# Patient Record
Sex: FEMALE | Race: OTHER | Employment: FULL TIME | URBAN - METROPOLITAN AREA
[De-identification: names, ages, dates, MRNs, and addresses within clinical notes are randomized per-mention and may not be internally consistent; named-entity substitution may affect disease eponyms.]

---

## 2018-08-21 ENCOUNTER — HOSPITAL ENCOUNTER (EMERGENCY)
Age: 48
Discharge: HOME | End: 2018-08-21
Attending: EMERGENCY MEDICINE
Payer: COMMERCIAL

## 2018-08-21 VITALS
TEMPERATURE: 97 F | OXYGEN SATURATION: 98 % | DIASTOLIC BLOOD PRESSURE: 54 MMHG | WEIGHT: 200 LBS | HEIGHT: 61 IN | HEART RATE: 76 BPM | BODY MASS INDEX: 37.76 KG/M2 | SYSTOLIC BLOOD PRESSURE: 133 MMHG | RESPIRATION RATE: 16 BRPM

## 2018-08-21 DIAGNOSIS — H01.001 BLEPHARITIS OF RIGHT UPPER EYELID, UNSPECIFIED TYPE: Primary | ICD-10-CM

## 2018-08-21 PROCEDURE — 99283 EMERGENCY DEPT VISIT LOW MDM: CPT

## 2018-08-21 RX ORDER — CEPHALEXIN 500 MG/1
500 CAPSULE ORAL 4 TIMES DAILY
Qty: 28 CAPSULE | Refills: 0 | Status: SHIPPED | OUTPATIENT
Start: 2018-08-21 | End: 2018-08-28

## 2018-08-21 RX ORDER — TETRACAINE HYDROCHLORIDE 5 MG/ML
1 SOLUTION OPHTHALMIC ONCE
Status: DISCONTINUED | OUTPATIENT
Start: 2018-08-21 | End: 2018-08-21

## 2018-08-21 RX ORDER — POLYMYXIN B SULFATE AND TRIMETHOPRIM 1; 10000 MG/ML; [USP'U]/ML
2 SOLUTION OPHTHALMIC EVERY 4 HOURS
Qty: 1 BOTTLE | Refills: 0 | Status: SHIPPED | OUTPATIENT
Start: 2018-08-21 | End: 2020-07-01 | Stop reason: ALTCHOICE

## 2018-08-21 NOTE — ED PROVIDER NOTES
Patient Seen in: THE [facility] Emergency Department In [place]    History   Patient presents with:  Cellulitis (integumentary, infectious)  Eye Visual Problem (opthalmic)    Stated Complaint: right eye irratation redness and swelling    HPI    Patient report appropriately. Eyes: sclera white, conjunctiva pink and moist, midrange pupils, equal round reactive to light, extraocular movements are intact. The right upper lid is erythematous, warm, and tender to touch. No fluctuance.   At the medial third, there i List    START taking these medications    cephALEXin (KEFLEX) 500 MG Oral Cap  Take 1 capsule (500 mg total) by mouth 4 (four) times daily.   Qty: 28 capsule Refills: 0    Polymyxin B-Trimethoprim (POLYTRIM) 99035-2.1 UNIT/ML-% Ophthalmic Solution  Place 2

## 2019-02-06 ENCOUNTER — HOSPITAL ENCOUNTER (EMERGENCY)
Age: 49
Discharge: HOME | End: 2019-02-06
Attending: EMERGENCY MEDICINE
Payer: COMMERCIAL

## 2019-02-06 ENCOUNTER — APPOINTMENT (OUTPATIENT)
Dept: GENERAL RADIOLOGY | Age: 49
End: 2019-02-06
Attending: EMERGENCY MEDICINE
Payer: COMMERCIAL

## 2019-02-06 VITALS
SYSTOLIC BLOOD PRESSURE: 135 MMHG | HEIGHT: 62 IN | OXYGEN SATURATION: 96 % | DIASTOLIC BLOOD PRESSURE: 54 MMHG | BODY MASS INDEX: 42.33 KG/M2 | WEIGHT: 230 LBS | HEART RATE: 90 BPM | TEMPERATURE: 100 F | RESPIRATION RATE: 18 BRPM

## 2019-02-06 DIAGNOSIS — J01.90 ACUTE SINUSITIS, RECURRENCE NOT SPECIFIED, UNSPECIFIED LOCATION: Primary | ICD-10-CM

## 2019-02-06 DIAGNOSIS — J40 BRONCHITIS: ICD-10-CM

## 2019-02-06 PROCEDURE — 99284 EMERGENCY DEPT VISIT MOD MDM: CPT | Performed by: EMERGENCY MEDICINE

## 2019-02-06 PROCEDURE — 93005 ELECTROCARDIOGRAM TRACING: CPT

## 2019-02-06 PROCEDURE — 71046 X-RAY EXAM CHEST 2 VIEWS: CPT | Performed by: EMERGENCY MEDICINE

## 2019-02-06 PROCEDURE — 93010 ELECTROCARDIOGRAM REPORT: CPT | Performed by: EMERGENCY MEDICINE

## 2019-02-06 RX ORDER — BENZONATATE 100 MG/1
100 CAPSULE ORAL 3 TIMES DAILY PRN
Qty: 30 CAPSULE | Refills: 0 | Status: SHIPPED | OUTPATIENT
Start: 2019-02-06 | End: 2019-03-08

## 2019-02-06 RX ORDER — AMOXICILLIN 500 MG/1
500 TABLET, FILM COATED ORAL 3 TIMES DAILY
Qty: 30 TABLET | Refills: 0 | Status: SHIPPED | OUTPATIENT
Start: 2019-02-06 | End: 2019-02-16

## 2019-02-07 NOTE — ED PROVIDER NOTES
Patient Seen in: THE [facility] Emergency Department In [place]    History   Patient presents with:  Fever (infectious)    Stated Complaint: FLU LIKE SYMPTOMS    HPI    This is a 15-year-old female who presents with complaints of flulike symptoms.   She has ha findings of a viral syndrome. TMs are clear without erythema  Throat has no erythema, exudate, peritonsillar abscess  There is no meningismus. She does have some maxillary sinus tenderness. Lungs: Clear to auscultation. No wheezing.   No retractions clinically sounds more of a bronchitis EKG slightly abnormal but she the pain is really only when she coughs I discussed we can do blood work and further testing she had and she does not want to do that she feels comfortable with conservative management sh

## 2019-02-07 NOTE — ED INITIAL ASSESSMENT (HPI)
Pt states that she has had body aches, ear pain, chest pressure, productive cough with green phlegm for the past three days. Pt states that she started with a fever today. Pt took Tylenol around 12.  Pt is afebrile here

## 2019-02-08 LAB
ATRIAL RATE: 71 BPM
P AXIS: 36 DEGREES
P-R INTERVAL: 152 MS
Q-T INTERVAL: 398 MS
QRS DURATION: 104 MS
QTC CALCULATION (BEZET): 432 MS
R AXIS: -10 DEGREES
T AXIS: 10 DEGREES
VENTRICULAR RATE: 71 BPM

## 2020-07-01 ENCOUNTER — OFFICE VISIT (OUTPATIENT)
Dept: FAMILY MEDICINE CLINIC | Facility: CLINIC | Age: 50
End: 2020-07-01
Payer: COMMERCIAL

## 2020-07-01 VITALS
HEIGHT: 63.5 IN | RESPIRATION RATE: 17 BRPM | BODY MASS INDEX: 37.97 KG/M2 | HEART RATE: 111 BPM | OXYGEN SATURATION: 97 % | SYSTOLIC BLOOD PRESSURE: 132 MMHG | WEIGHT: 217 LBS | TEMPERATURE: 98 F | DIASTOLIC BLOOD PRESSURE: 82 MMHG

## 2020-07-01 DIAGNOSIS — Z13.21 SCREENING FOR ENDOCRINE, NUTRITIONAL, METABOLIC AND IMMUNITY DISORDER: ICD-10-CM

## 2020-07-01 DIAGNOSIS — Z13.228 SCREENING FOR ENDOCRINE, NUTRITIONAL, METABOLIC AND IMMUNITY DISORDER: ICD-10-CM

## 2020-07-01 DIAGNOSIS — Z12.31 ENCOUNTER FOR SCREENING MAMMOGRAM FOR MALIGNANT NEOPLASM OF BREAST: ICD-10-CM

## 2020-07-01 DIAGNOSIS — Z13.29 SCREENING FOR ENDOCRINE, NUTRITIONAL, METABOLIC AND IMMUNITY DISORDER: ICD-10-CM

## 2020-07-01 DIAGNOSIS — Z13.0 SCREENING FOR ENDOCRINE, NUTRITIONAL, METABOLIC AND IMMUNITY DISORDER: ICD-10-CM

## 2020-07-01 DIAGNOSIS — R10.31 ABDOMINAL PAIN, RLQ: ICD-10-CM

## 2020-07-01 DIAGNOSIS — G89.29 CHRONIC ABDOMINAL PAIN: Primary | ICD-10-CM

## 2020-07-01 DIAGNOSIS — R10.9 CHRONIC ABDOMINAL PAIN: Primary | ICD-10-CM

## 2020-07-01 LAB
APPEARANCE: CLEAR
BILIRUBIN: NEGATIVE
GLUCOSE (URINE DIPSTICK): NEGATIVE MG/DL
KETONES (URINE DIPSTICK): NEGATIVE MG/DL
MULTISTIX LOT#: ABNORMAL NUMERIC
NITRITE, URINE: NEGATIVE
OCCULT BLOOD: NEGATIVE
PH, URINE: 6.5 (ref 4.5–8)
PROTEIN (URINE DIPSTICK): NEGATIVE MG/DL
SPECIFIC GRAVITY: 1.02 (ref 1–1.03)
URINE-COLOR: YELLOW
UROBILINOGEN,SEMI-QN: 0.2 MG/DL (ref 0–1.9)

## 2020-07-01 PROCEDURE — 81003 URINALYSIS AUTO W/O SCOPE: CPT | Performed by: EMERGENCY MEDICINE

## 2020-07-01 PROCEDURE — 99204 OFFICE O/P NEW MOD 45 MIN: CPT | Performed by: EMERGENCY MEDICINE

## 2020-07-01 NOTE — PROGRESS NOTES
q  Chief Complaint:   Patient presents with:  Abdominal Pain: NP. C/o RT abdominal/low back pain     HPI:   This is a 44year old female         ABDOMINAL PAIN  On going for the past several months. Starts in epigastric area and then radiates to RLQ.  PAin (Temporal)   Resp 17   Ht 63.5\"   Wt 217 lb (98.4 kg)   SpO2 97%   BMI 37.84 kg/m²  Estimated body mass index is 37.84 kg/m² as calculated from the following:    Height as of this encounter: 63.5\". Weight as of this encounter: 217 lb (98.4 kg).    Gail colonoscopy      PATIENT INSTRUCTIONS:    1. Arrange for CT scan  2. Have blood tests done after fasting  3. Follow up with GI, Dr. Judith Shea. Marco Antoniou  4. Follow up in 2-3 weeks for annual physical and PAP  5.  Arrange for mammogram          FOLLOW UP: 2-3 weeks

## 2020-07-01 NOTE — PATIENT INSTRUCTIONS
Thank you for choosing [facility]  To Do:  FOR [address]        1. Arrange for CT scan  2. Have blood tests done after fasting  3. Follow up with GI, Dr. Kim Muñiz  4. Follow up in 2-3 weeks for annual physical and PAP  5.  Arrange for [employer] yourself. Diet  · Don’t force yourself to eat, especially if having cramps, vomiting, or diarrhea. · Water is important so you don't get dehydrated. Soup may also be good. Sports drinks may also help, especially if they are not too acidic.  Don't drink mott [address], [facility] [place]. All rights reserved. This information is not intended as a substitute for professional medical care. Always follow your healthcare professional's instructions.         Abdominal Pain  Abdominal pain is pain in the stomach or be report any family history of stomach or intestinal problems, or cancers. Tell your provider about all your alcohol use and drug use. Tell your provider about all medicines you use, including herbs, vitamins, and supplements.    Treating abdominal pain   Jose aspirin or over-the-counter pain and fever medicines, if possible. This includes nonsteroidal anti-inflammatory drugs (NSAIDs). · Lose excess weight. · Finish eating at least 2 hours before you go to bed or lie down. · Raise the head of your bed.   Malorie Polanco [address]

## 2020-07-02 ENCOUNTER — HOSPITAL ENCOUNTER (OUTPATIENT)
Dept: CT IMAGING | Age: 50
Discharge: HOME | End: 2020-07-02
Attending: EMERGENCY MEDICINE
Payer: COMMERCIAL

## 2020-07-02 ENCOUNTER — TELEPHONE (OUTPATIENT)
Dept: FAMILY MEDICINE CLINIC | Facility: CLINIC | Age: 50
End: 2020-07-02

## 2020-07-02 DIAGNOSIS — G89.29 CHRONIC ABDOMINAL PAIN: ICD-10-CM

## 2020-07-02 DIAGNOSIS — R10.31 ABDOMINAL PAIN, RLQ: ICD-10-CM

## 2020-07-02 DIAGNOSIS — G89.29 CHRONIC ABDOMINAL PAIN: Primary | ICD-10-CM

## 2020-07-02 DIAGNOSIS — R10.9 CHRONIC ABDOMINAL PAIN: ICD-10-CM

## 2020-07-02 DIAGNOSIS — R10.9 CHRONIC ABDOMINAL PAIN: Primary | ICD-10-CM

## 2020-07-02 LAB — CREAT BLD-MCNC: 0.8 MG/DL (ref 0.55–1.02)

## 2020-07-02 PROCEDURE — 82565 ASSAY OF CREATININE: CPT

## 2020-07-02 PROCEDURE — 74177 CT ABD & PELVIS W/CONTRAST: CPT | Performed by: EMERGENCY MEDICINE

## 2020-07-02 NOTE — TELEPHONE ENCOUNTER
Shilpi Walker from [place] [id number] called. Patient is scheduled today for CT Abdomen with and without contrast.  He states that the recommendation is CT abdomen and pelvis contrast only. Patient is scheduled for today.     Spoke with awa Nieves for updated or

## 2020-07-06 ENCOUNTER — TELEPHONE (OUTPATIENT)
Dept: FAMILY MEDICINE CLINIC | Facility: CLINIC | Age: 50
End: 2020-07-06

## 2020-07-06 NOTE — TELEPHONE ENCOUNTER
Katie Perea MD  [facility] Clinical Staff             No acute abdominal findings   + hepatomegaly   Follow up with GI as instructed on last OV

## 2020-07-06 NOTE — TELEPHONE ENCOUNTER
Patient calling back again, wants to know if she will be contacted today with results, please f/u with pt

## 2020-08-31 PROBLEM — R10.31 CHRONIC RLQ PAIN: Status: ACTIVE | Noted: 2020-08-31

## 2020-08-31 PROBLEM — G89.29 CHRONIC RLQ PAIN: Status: ACTIVE | Noted: 2020-08-31

## 2022-04-22 ENCOUNTER — TELEPHONE (OUTPATIENT)
Dept: FAMILY MEDICINE CLINIC | Facility: CLINIC | Age: 52
End: 2022-04-22

## 2024-04-11 ENCOUNTER — TELEPHONE (OUTPATIENT)
Dept: FAMILY MEDICINE CLINIC | Facility: CLINIC | Age: 54
End: 2024-04-11

## 2024-04-11 NOTE — TELEPHONE ENCOUNTER
Patient scheduled an appt with you on [facility] but she hasn't been seen here since 07/2021,  Ok to re-establish or no?  She has [employer] PPO.  Please advise.

## 2024-07-15 ENCOUNTER — TELEPHONE (OUTPATIENT)
Dept: FAMILY MEDICINE CLINIC | Facility: CLINIC | Age: 54
End: 2024-07-15

## 2024-07-15 NOTE — TELEPHONE ENCOUNTER
Patient was last seen by you on 07/01/2020, Pt would like to schedule an appointment with you. Will you see her again?

## 2024-07-16 ENCOUNTER — HOSPITAL ENCOUNTER (OUTPATIENT)
Dept: GENERAL RADIOLOGY | Age: 54
Discharge: HOME | End: 2024-07-16
Attending: NURSE PRACTITIONER
Payer: COMMERCIAL

## 2024-07-16 ENCOUNTER — OFFICE VISIT (OUTPATIENT)
Dept: FAMILY MEDICINE CLINIC | Facility: CLINIC | Age: 54
End: 2024-07-16
Payer: COMMERCIAL

## 2024-07-16 VITALS
SYSTOLIC BLOOD PRESSURE: 110 MMHG | WEIGHT: 204 LBS | DIASTOLIC BLOOD PRESSURE: 80 MMHG | HEIGHT: 62 IN | RESPIRATION RATE: 16 BRPM | OXYGEN SATURATION: 98 % | BODY MASS INDEX: 37.54 KG/M2 | HEART RATE: 72 BPM

## 2024-07-16 DIAGNOSIS — M77.11 LATERAL EPICONDYLITIS OF RIGHT ELBOW: ICD-10-CM

## 2024-07-16 DIAGNOSIS — S16.1XXA STRAIN OF NECK MUSCLE, INITIAL ENCOUNTER: ICD-10-CM

## 2024-07-16 DIAGNOSIS — M24.811 INTERNAL DERANGEMENT OF RIGHT SHOULDER: Primary | ICD-10-CM

## 2024-07-16 DIAGNOSIS — M24.811 INTERNAL DERANGEMENT OF RIGHT SHOULDER: ICD-10-CM

## 2024-07-16 PROCEDURE — 3074F SYST BP LT 130 MM HG: CPT | Performed by: NURSE PRACTITIONER

## 2024-07-16 PROCEDURE — 73030 X-RAY EXAM OF SHOULDER: CPT | Performed by: NURSE PRACTITIONER

## 2024-07-16 PROCEDURE — 99204 OFFICE O/P NEW MOD 45 MIN: CPT | Performed by: NURSE PRACTITIONER

## 2024-07-16 PROCEDURE — 3008F BODY MASS INDEX DOCD: CPT | Performed by: NURSE PRACTITIONER

## 2024-07-16 PROCEDURE — 3079F DIAST BP 80-89 MM HG: CPT | Performed by: NURSE PRACTITIONER

## 2024-07-16 PROCEDURE — 72050 X-RAY EXAM NECK SPINE 4/5VWS: CPT | Performed by: NURSE PRACTITIONER

## 2024-07-16 RX ORDER — METHYLPREDNISOLONE 4 MG/1
TABLET ORAL
Qty: 21 EACH | Refills: 0 | Status: SHIPPED | OUTPATIENT
Start: 2024-07-16

## 2024-07-16 RX ORDER — CYCLOBENZAPRINE HCL 10 MG
10 TABLET ORAL 3 TIMES DAILY
COMMUNITY
Start: 2024-07-14

## 2024-07-16 RX ORDER — GABAPENTIN 300 MG/1
300 CAPSULE ORAL NIGHTLY
Qty: 30 CAPSULE | Refills: 0 | Status: SHIPPED | OUTPATIENT
Start: 2024-07-16 | End: 2024-08-15

## 2024-07-16 RX ORDER — KETOROLAC TROMETHAMINE 10 MG/1
TABLET, FILM COATED ORAL
COMMUNITY
Start: 2024-07-14

## 2024-07-16 RX ORDER — IBUPROFEN 200 MG
200 TABLET ORAL EVERY 6 HOURS PRN
COMMUNITY

## 2024-07-16 NOTE — PROGRESS NOTES
Chief Complaint   Patient presents with    hospitals Care     NP esta care Right shoulder pain        HPI: R shoulder has been hurting for 5 days Sharp, Dull,throbbing  in character. Radiation to the upper mid R arm and R elbow   . Rates pain at  5-6 /10.  No weakness.  + numbness or tingling. Worsening. Movement makes it worse and rest makes it better.Rasing movement above the shoulder line is very uncomfortable for the pt.  Injury mechanism: none   No redness, bruising, lacerations.No weakness.  Current medications: muscle relaxant and ketorolac  H/o surgery of the shoulder: no       Current Outpatient Medications   Medication Sig Dispense Refill    Ketorolac Tromethamine 10 MG Oral Tab Take 1 tablet by mouth 3 times per day for 5 days Take medication with food and take a daily H2 blocker such as Pepcid AC.      cyclobenzaprine 10 MG Oral Tab Take 1 tablet (10 mg total) by mouth 3 (three) times daily.      ibuprofen (ADVIL) 200 MG Oral Tab Take 1 tablet (200 mg total) by mouth every 6 (six) hours as needed for Pain.      Aspirin-Acetaminophen-Caffeine (EXCEDRIN MIGRAINE OR) Take by mouth.        Past Medical History:    Migraines      Past Surgical History:   Procedure Laterality Date          Carpal tunnel release      Cholecystectomy      Hysterectomy      Removal gallbladder      Total abdom hysterectomy        Social History:   Social History     Socioeconomic History    Marital status:    Tobacco Use    Smoking status: Never    Smokeless tobacco: Never   Substance and Sexual Activity    Alcohol use: Yes    Drug use: Never               ROS:  GEN: no fever, no chills, no fatigue  CHEST: no chest pains.  SKIN: no rashes  HEM: no ecchymoses  JOINTS: no other joints pain.  NEURO: no tingling, no weakness, no abnormal sensation.      /80   Pulse 72   Resp 16   Ht 5' 2\" (1.575 m)   Wt 204 lb (92.5 kg)   SpO2 98%   BMI 37.31 kg/m²     PE:  Gen:  WD/WN NAD  HEENT:  DOUG  EOM-i.  LUNGS:CTA nuha.  HEART:S1/S2 reg., no murmurs, clicks, gallops  SKIN:no rashes on the chest or back.  Back:  Normal on inspection, no pain on palpation of the spinal and paraspinal muscles.   Neuro:  Reflexes at knees 2+ and symmetric  R shoulder: limited extension to 90 degrees, full extension, internal and external rotation normal, butler and neers negative, empty can testing negative. DTR and STR of limb wnl.  R elbow tenderness palpation lateral epicondyle , Limited ROM due to pain       A/P  Diagnoses and all orders for this visit:    Internal derangement of right shoulder  -     methylPREDNISolone (MEDROL) 4 MG Oral Tablet Therapy Pack; As directed.  -     XR SHOULDER, COMPLETE (MIN 2 VIEWS), RIGHT (CPT=73030); Future  -     Ortho Referral - In Network    Lateral epicondylitis of right elbow  -     methylPREDNISolone (MEDROL) 4 MG Oral Tablet Therapy Pack; As directed.  -     gabapentin 300 MG Oral Cap; Take 1 capsule (300 mg total) by mouth nightly.  -     Ortho Referral - In Network    Strain of neck muscle, initial encounter  -     XR CERVICAL SPINE (4VIEWS) (CPT=72050); Future     Rest   Ice or heat   F/u for worsening symptoms

## 2024-07-18 ENCOUNTER — TELEPHONE (OUTPATIENT)
Dept: ORTHOPEDICS CLINIC | Facility: CLINIC | Age: 54
End: 2024-07-18

## 2024-07-18 NOTE — TELEPHONE ENCOUNTER
Patient is scheduled for Internal derangement of right shoulder. X-rays in Epic. Please advise if imaging is needed.  Future Appointments   Date Time Provider Department Center   8/7/2024 11:20 AM [id number]

## 2024-07-22 ENCOUNTER — TELEPHONE (OUTPATIENT)
Dept: FAMILY MEDICINE CLINIC | Facility: CLINIC | Age: 54
End: 2024-07-22

## 2024-07-22 DIAGNOSIS — S16.1XXA STRAIN OF NECK MUSCLE, INITIAL ENCOUNTER: Primary | ICD-10-CM

## 2024-07-22 DIAGNOSIS — M50.30 DEGENERATION OF CERVICAL INTERVERTEBRAL DISC: ICD-10-CM

## 2024-07-22 NOTE — TELEPHONE ENCOUNTER
----- Message from Iam Melgar sent at 7/16/2024 12:28 PM CDT -----  Disc degeneration and disc narrowing referral to see spine

## 2024-07-26 ENCOUNTER — SPINE CENTER NAVIGATION (OUTPATIENT)
Dept: SURGERY | Facility: CLINIC | Age: 54
End: 2024-07-26

## 2024-07-26 ENCOUNTER — MED REC SCAN ONLY (OUTPATIENT)
Dept: FAMILY MEDICINE CLINIC | Facility: CLINIC | Age: 54
End: 2024-07-26

## 2024-07-26 ENCOUNTER — TELEPHONE (OUTPATIENT)
Dept: FAMILY MEDICINE CLINIC | Facility: CLINIC | Age: 54
End: 2024-07-26

## 2024-07-26 DIAGNOSIS — M54.2 CERVICAL PAIN: Primary | ICD-10-CM

## 2024-07-29 NOTE — PROGRESS NOTES
Spine Center Referral Navigation Encounter Note    Referred by: Iam Melgar    HPI: \" shoulder has been hurting for 5 days Sharp, Dull,throbbing  in character. Radiation to the upper mid R arm and R elbow   . Rates pain at  5-6 /10.  No weakness.  + numbness or tingling. Worsening. Movement makes it worse and rest makes it better.Rasing movement above the shoulder line is very uncomfortable for the pt.  Injury mechanism: none   No redness, bruising, lacerations.No weakness.  Current medications: muscle relaxant and ketorolac  H/o surgery of the shoulder: no \" - per Ramón note    Very repetitive motions and lifting at work.     Subjective Symptoms: Sharp, right pain pain from shoulder down arm. Numbness and tingling in forearm and all fingers except thumb. Weakness in hand and changes in handwriting.     Subjective Pertinent Negatives: No falls or fevers. No bowel or bladder incontinence. No trauma.     Physical Therapy: No    Medications for Spine Symptoms: Tramadol, Medrol, Gabapentin, Ketorolac, gabapentin, ibuprofen.     Imaging: XR    Previous Spine Injections: No    Previous Spine Surgery: No    Previously Seen Spine Care Providers: None    Information above is from chart review and patient reported.     Referred to: Ortho Spine

## 2024-08-02 ENCOUNTER — TELEPHONE (OUTPATIENT)
Dept: FAMILY MEDICINE CLINIC | Facility: CLINIC | Age: 54
End: 2024-08-02

## 2024-08-02 DIAGNOSIS — S16.1XXA STRAIN OF NECK MUSCLE, INITIAL ENCOUNTER: Primary | ICD-10-CM

## 2024-08-02 NOTE — TELEPHONE ENCOUNTER
Patient is asking for a call back to discuss her referrals. She has an apt to see Dr. Flower and wants to know if she should keep it or cx it to see the specialist.     She is physical at work and is still having discomfort in her arm and shoulder.   has been taking  Ibuprofen during the day but wants to take something else because its causing stomach pain

## 2024-08-02 NOTE — TELEPHONE ENCOUNTER
Pt returned call  Pt states over 2 weeks ago she had right shoulder/elbow pain-filled out workmen's comp at [facility]   Was seen by NP on 07/16/24  Pt did complete the steroid pack   Does take gabapentin but its only at night  Pt asking if she can take anything else for pain  Has been taking ibuprofen but it is upsetting her stomach  Advised pt to stop ibuprofen   RICE therapy explained to pt   Pt then informed spine navigator was calling her  Wrong number in chart-new number has been updated  Spine navigator number provided   Instructed to keep appt with Dr. Flower for 08/07  Pt requesting for pain medication

## 2024-08-02 NOTE — TELEPHONE ENCOUNTER
Called pt to obtain additional information  No answer, call just kept ringing   Will jesus for follow up

## 2024-08-05 ENCOUNTER — TELEPHONE (OUTPATIENT)
Dept: SURGERY | Facility: CLINIC | Age: 54
End: 2024-08-05

## 2024-08-05 RX ORDER — TRAMADOL HYDROCHLORIDE 50 MG/1
50 TABLET ORAL EVERY 6 HOURS PRN
Qty: 30 TABLET | Refills: 1 | Status: SHIPPED | OUTPATIENT
Start: 2024-08-05

## 2024-08-05 NOTE — TELEPHONE ENCOUNTER
Called pt and informed her medication sent to pharmacy  Questions answered  Pt verbalized understanding.

## 2024-08-07 ENCOUNTER — OFFICE VISIT (OUTPATIENT)
Dept: ORTHOPEDICS CLINIC | Facility: CLINIC | Age: 54
End: 2024-08-07

## 2024-08-07 VITALS — HEIGHT: 62 IN | WEIGHT: 204 LBS | BODY MASS INDEX: 37.54 KG/M2

## 2024-08-07 DIAGNOSIS — M54.12 CERVICAL RADICULOPATHY: Primary | ICD-10-CM

## 2024-08-07 PROCEDURE — 99204 OFFICE O/P NEW MOD 45 MIN: CPT | Performed by: ORTHOPAEDIC SURGERY

## 2024-08-07 RX ORDER — METHYLPREDNISOLONE 4 MG/1
TABLET ORAL
Qty: 1 EACH | Refills: 0 | Status: SHIPPED | OUTPATIENT
Start: 2024-08-07

## 2024-08-07 NOTE — H&P
Orthopaedic Surgery  [address] [place] [id number]  [phone]     NEW PATIENT VISIT - HISTORY AND PHYSICAL EXAMINATION     Name: Miriam Cruz   MRN: [id number]  Date: 2024     CC: Right shoulder pain    REFERRED BY: Bernarda Mejia MD    HPI:   Miriam Cruz is a very pleasant 54 year old right-hand dominant female who presents today for evaluation of right shoulder pain onset 2024. She presented to immediate care the next day and was given an injection which provided minimal relief. Pain is 5/10 and radiates down the arm. She has not tried any conservative treatment measures.     She lives with her  and son. Enjoys reading the Bible. Works as a .    PMH:   Past Medical History:    Migraines       PAST SURGICAL HX:  Past Surgical History:   Procedure Laterality Date          Carpal tunnel release      Cholecystectomy      Hysterectomy      Removal gallbladder      Total abdom hysterectomy         FAMILY HX:  Family History   Problem Relation Age of Onset    Heart Disorder Father     Diabetes Father     Kidney Disease Father     Other (Gout) Father        ALLERGIES:  Aleve [naproxen]    MEDICATIONS:   Current Outpatient Medications   Medication Sig Dispense Refill    traMADol 50 MG Oral Tab Take 1 tablet (50 mg total) by mouth every 6 (six) hours as needed for Pain. 30 tablet 1    Ketorolac Tromethamine 10 MG Oral Tab Take 1 tablet by mouth 3 times per day for 5 days Take medication with food and take a daily H2 blocker such as Pepcid AC.      cyclobenzaprine 10 MG Oral Tab Take 1 tablet (10 mg total) by mouth 3 (three) times daily.      ibuprofen (ADVIL) 200 MG Oral Tab Take 1 tablet (200 mg total) by mouth every 6 (six) hours as needed for Pain.      methylPREDNISolone (MEDROL) 4 MG Oral Tablet Therapy Pack As directed. 21 each 0    gabapentin 300 MG Oral Cap Take 1 capsule (300 mg total) by mouth nightly. 30 capsule 0     Aspirin-Acetaminophen-Caffeine (EXCEDRIN MIGRAINE OR) Take by mouth.         ROS: A comprehensive 14 point review of systems was performed and was negative aside from the aforementioned per history of present illness.    SOCIAL HX:  Social History     Tobacco Use    Smoking status: Never    Smokeless tobacco: Never   Substance Use Topics    Alcohol use: Yes       PE:   Vitals:    08/07/24 1150   Weight: 204 lb   Height: 5' 2\" (1.575 m)     Estimated body mass index is 37.31 kg/m² as calculated from the following:    Height as of this encounter: 5' 2\" (1.575 m).    Weight as of this encounter: 204 lb.    Physical Exam  Constitutional:       Appearance: Normal appearance.   HENT:      Head: Normocephalic and atraumatic.   Eyes:      Extraocular Movements: Extraocular movements intact.   Neck:      Musculoskeletal: Normal range of motion and neck supple.   Cardiovascular:      Pulses: Normal pulses.   Pulmonary:      Effort: Pulmonary effort is normal. No respiratory distress.   Abdominal:      General: There is no distension.   Skin:     General: Skin is warm.      Capillary Refill: Capillary refill takes less than 2 seconds.      Findings: No bruising.   Neurological:      General: No focal deficit present.      Mental Status: Alert.   Psychiatric:         Mood and Affect: Mood normal.     Examination of the right shoulder demonstrates:   Skin is intact, warm and dry.   Cervical:  Full ROM  Spurling's  Positive towards the right    Deformity:   none  Atrophy:   none    Scapular winging: Negative    Palpation:     AC Joint   Negative  Biceps Tendon  Negative  Greater Tuberosity Negative    ROM:   Forward Flexion:  full and symmetric  Abduction:   full and symmetric  External Rotation:  full and symmetric  Internal Rotation:  full and symmetric    Rotator Cuff Strength:   Supraspinatus:   5/5  Subscapularis:   5/5  Infraspinatus/Teres: 5/5    Provocative Tests:   Gurrola:   Negative  Speed's:   Negative  Plainfield's:    Negative  Lift-off:    Negative  Apprehension:  Negative  Sulcus Sign:   Negative    Neurovascular Upper Extremity (Bilateral)  Motor:    5/5 EPL, Finger Abduction, , Pinch, Deltoid  Circulation:   Normal, 2+ radial pulse    Numbness from Ulnar four digits, excluding the thumb.    The contralateral upper extremity is without limitation in range of motion or strength, no positive provocative maneuvers.       Radiographic Examination/Diagnostics:    Shoulder XR personally viewed, independently interpreted and radiology report was reviewed.    XR SHOULDER, COMPLETE (MIN 2 VIEWS), RIGHT (CPT=73030)    Result Date: 7/16/2024  PROCEDURE:  XR SHOULDER, COMPLETE (MIN 2 VIEWS), RIGHT (CPT=73030)  TECHNIQUE:  Multiple views were obtained.  COMPARISON:  None.  INDICATIONS:  M24.811 Internal derangement of right shoulder  PATIENT STATED HISTORY: (As transcribed by Technologist)  Pt c/o extreme pain between her neck and R shoulder x 5 days.  The pain shoots down her arm, and is causing weakness, numbness and tingling of the forearm and 2nd-4th fingers.  No known injury.  No prev fx or surgery.    FINDINGS:  Negative for fracture, dislocation, deformity or other acute bony abnormalities.  No soft tissue abnormalities.             CONCLUSION:  No acute fracture or other acute bony process.  LOCATION:  [facility]   Dictated by (CST): Filiberto Clark MD on 7/16/2024 at 11:36 AM     Finalized by (CST): Filiberto Clark MD on 7/16/2024 at 11:36 AM         IMPRESSION: Miriam Cruz is a 54 year old Right hand dominant female with right cervical radiculopathy symptomatic since July 13, 2024.    An MRI of the cervical spine was ordered and patient will follow up with Dr. Paiz for review.    PLAN:   We had a detailed discussion outlining the etiology, anatomy, pathophysiology, and natural history of patient's findings. Imaging was reviewed in detail and correlated to a 3-dimensional model of the shoulder.     I elected to order an MRI  scan of the cervical spine to further characterize internal derangement.    Patient will follow up with Dr. Paiz for further evaluation.     We prescribed a Medrol Dosepack.     The patient had the opportunity to ask questions and all questions were answered appropriately.       FOLLOW-UP:   Return to clinic on an as needed basis.       Urmila Flower MD  Knee, Shoulder, & Elbow Surgery / Sports Medicine Specialist  Orthopaedic Surgery  [address] [place] [address] [id number] [place] [address].[facility]  [email or web address]  t: [phone]  o: [phone]  f: [phone]    This note was dictated using Dragon software.  While it was briefly proofread prior to completion, some grammatical, spelling, and word choice errors due to dictation may still occur.

## 2024-08-08 ENCOUNTER — TELEPHONE (OUTPATIENT)
Dept: FAMILY MEDICINE CLINIC | Facility: CLINIC | Age: 54
End: 2024-08-08

## 2024-08-08 NOTE — TELEPHONE ENCOUNTER
Patient called to say that she needs her OV note to say that her pain discussed in her last visit is due to work. She said that he claim from [employer] is being denied because that was not notated.

## 2024-08-09 ENCOUNTER — TELEPHONE (OUTPATIENT)
Dept: FAMILY MEDICINE CLINIC | Facility: CLINIC | Age: 54
End: 2024-08-09

## 2024-08-09 NOTE — TELEPHONE ENCOUNTER
Pt states that at her last appointment with [facility] 07/16/2024, she clarified with staff that her visit was workman's comp related. Pt states the  at her place of employment received paperwork stating that the visit was not workman's comp related and that she had no injury- a head ache is listed as the findings from her appointment. Pt would like to speak with you about either the after visit notes or which ever form may have been faxed and filled out upon request by her employer.

## 2024-08-09 NOTE — TELEPHONE ENCOUNTER
Patient requesting her OV on 7/16 needs to say that her pain that is discussed in the visit is due to work   She says that her claim from [employer] is being denied because that is not noted in the note    Please advise

## 2024-08-16 NOTE — TELEPHONE ENCOUNTER
Pt called stating she hasn't heard back from [facility]. Pt has been forwarded to Evonne's VM - per pt.

## 2024-08-16 NOTE — TELEPHONE ENCOUNTER
Spoke to patient and she said that they(the staff in the room)  were told that this was work related due to the pushing pulling lifting etc when they were inputting some information in the computer.  Patient states that Ayanara was in the room but the other person walked out.    She is asking if this OV note can be addended to include the WC information or does she need to schedule an another appointment for this?

## 2024-09-05 ENCOUNTER — TELEPHONE (OUTPATIENT)
Dept: ORTHOPEDICS CLINIC | Facility: CLINIC | Age: 54
End: 2024-09-05

## 2024-09-05 NOTE — TELEPHONE ENCOUNTER
Received fax request for WC Claim [id number] , requesting LOV 8.7.24  and  work status letter, MRI order for authorization and no future appts    Faxed  10 pages of requested information to  Dora MOTLEY at [employer] at [phone]. Confirmation received below:

## 2024-12-17 ENCOUNTER — OFFICE VISIT (OUTPATIENT)
Dept: FAMILY MEDICINE CLINIC | Facility: CLINIC | Age: 54
End: 2024-12-17
Payer: COMMERCIAL

## 2024-12-17 VITALS
DIASTOLIC BLOOD PRESSURE: 80 MMHG | WEIGHT: 201 LBS | HEART RATE: 70 BPM | RESPIRATION RATE: 21 BRPM | SYSTOLIC BLOOD PRESSURE: 140 MMHG | OXYGEN SATURATION: 98 % | BODY MASS INDEX: 36.99 KG/M2 | HEIGHT: 62 IN

## 2024-12-17 DIAGNOSIS — Z12.31 SCREENING MAMMOGRAM FOR BREAST CANCER: ICD-10-CM

## 2024-12-17 DIAGNOSIS — M72.2 PLANTAR FASCIITIS: Primary | ICD-10-CM

## 2024-12-17 PROCEDURE — 3079F DIAST BP 80-89 MM HG: CPT | Performed by: EMERGENCY MEDICINE

## 2024-12-17 PROCEDURE — 3008F BODY MASS INDEX DOCD: CPT | Performed by: EMERGENCY MEDICINE

## 2024-12-17 PROCEDURE — 3077F SYST BP >= 140 MM HG: CPT | Performed by: EMERGENCY MEDICINE

## 2024-12-17 PROCEDURE — 99213 OFFICE O/P EST LOW 20 MIN: CPT | Performed by: EMERGENCY MEDICINE

## 2024-12-17 RX ORDER — METHYLPREDNISOLONE 4 MG/1
TABLET ORAL
Qty: 21 EACH | Refills: 0 | Status: SHIPPED | OUTPATIENT
Start: 2024-12-17

## 2024-12-17 NOTE — PATIENT INSTRUCTIONS
Thank you for choosing [facility]  To Do:  FOR [employer]    Follow up with podiatry, Dr Cruz  Xray left foot  Avoid flat shoes  Take medrol dose pack for pain  Gentle streching exerciseis  May try over the counter shoe inserts for more arch support  Follow up for annual physical when ready  Arrange for mammogram

## 2024-12-17 NOTE — PROGRESS NOTES
Chief Complaint:   Chief Complaint   Patient presents with    Foot Pain     Left foot pain, plantar pain? Shoots up the calf. 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  Pain has been on and off  Pain worse with prolonged standing  Pain better with elevating  Pain worse in the AM with the first steps  No injury         PMSH       Past Medical History:    Migraines     Past Surgical History:   Procedure Laterality Date          Carpal tunnel release      Cholecystectomy      Hysterectomy      Removal gallbladder      Total abdom hysterectomy       Social History:  Social History     Socioeconomic History    Marital status:    Tobacco Use    Smoking status: Never    Smokeless tobacco: Never   Vaping Use    Vaping status: Never Used   Substance and Sexual Activity    Alcohol use: Yes    Drug use: Never     Family History:  Family History   Problem Relation Age of Onset    Heart Disorder Father     Diabetes Father     Kidney Disease Father     Other (Gout) Father      Allergies:  Allergies[1]  Current Meds:  Medications Ordered Prior to Encounter[2]   Counseling given: Not Answered         PROBLEM LIST     Patient Active Problem List   Diagnosis    Mastodynia    Chronic RLQ pain         REVIEW OF SYSTEMS:   Review of systems significant for LEFT FOOT PAIN  The rest of the review of systems is negative except those stated as above    PHYSICAL EXAM:   /80   Pulse 70   Resp 21   Ht 5' 2\" (1.575 m)   Wt 201 lb (91.2 kg)   SpO2 98%   BMI 36.76 kg/m²  Estimated body mass index is 36.76 kg/m² as calculated from the following:    Height as of this encounter: 5' 2\" (1.575 m).    Weight as of this encounter: 201 lb (91.2 kg).   Vital signs reviewed.Appears stated age, well groomed.  GENERAL:  well developed, well nourished, well hydrated, no distress  SKIN: good skin turgor, no obvious rashes  HEENT: atraumatic, normocephalic, ears, nose and throat are clear  EYES: sclera non icteric bilateral  NECK: supple, no adenopathy, no thyromegaly  LUNGS: clear to auscultation, no RRW  CARDIO: RRR without murmur  EXTREMITIES: no cyanosis, clubbing or edema  Left foot shows no acute deformity low-lying plantar arch noted.  Tenderness palpated throughout the plantar arch and along the left medial tubercle.  No tenderness to the dorsum of the left foot no tenderness over medial or lateral epicondyle.  No calf swelling Homans' sign negative..  Pulses full and equal        ASSESSMENT AND PLAN:         1. Plantar fasciitis  - XR FOOT, COMPLETE (MIN 3 VIEWS), LEFT (CPT=73630); Future  - methylPREDNISolone 4 MG Oral Tablet Therapy Pack; Use as directed  Dispense: 21 each; Refill: 0  - PODIATRY - INTERNAL    2. Screening mammogram for breast cancer  - [facility] ALESSIO 2D+3D SCREENING BILAT (CPT=77067/10631); Future    Symptoms and physical findings were consistent with plantars fasciitis.  Advised symptomatic relief with rest elevation avoiding flatfoot wear.  Will Rx Medrol Dosepak.  Follow-up with podiatry for further recommendations.  Recommend over-the-counter shoe inserts to help with arch support.  Patient to follow-up for annual physical             [1]   Allergies  Allergen Reactions    Aleve [Naproxen]    [2]   Current Outpatient Medications on File Prior to Visit   Medication Sig Dispense Refill    traMADol 50 MG Oral Tab Take 1 tablet (50 mg total) by mouth every 6 (six) hours as needed for Pain. 30 tablet 1    ibuprofen (ADVIL) 200 MG Oral Tab Take 1 tablet (200 mg total) by mouth every 6 (six) hours as needed for Pain.      Aspirin-Acetaminophen-Caffeine (EXCEDRIN MIGRAINE OR) Take by mouth as needed.       No current facility-administered medications on file prior to visit.

## 2024-12-18 ENCOUNTER — HOSPITAL ENCOUNTER (OUTPATIENT)
Dept: GENERAL RADIOLOGY | Age: 54
Discharge: HOME | End: 2024-12-18
Attending: EMERGENCY MEDICINE
Payer: COMMERCIAL

## 2024-12-18 DIAGNOSIS — M72.2 PLANTAR FASCIITIS: ICD-10-CM

## 2024-12-18 PROCEDURE — 73630 X-RAY EXAM OF FOOT: CPT | Performed by: EMERGENCY MEDICINE

## 2025-01-21 ENCOUNTER — LAB ENCOUNTER (OUTPATIENT)
Dept: LAB | Age: 55
End: 2025-01-21
Attending: PODIATRIST
Payer: COMMERCIAL

## 2025-01-21 ENCOUNTER — OFFICE VISIT (OUTPATIENT)
Facility: LOCATION | Age: 55
End: 2025-01-21
Payer: COMMERCIAL

## 2025-01-21 DIAGNOSIS — R53.83 FATIGUE, UNSPECIFIED TYPE: ICD-10-CM

## 2025-01-21 DIAGNOSIS — M72.2 PLANTAR FASCIITIS OF LEFT FOOT: Primary | ICD-10-CM

## 2025-01-21 DIAGNOSIS — M79.672 PAIN OF LEFT HEEL: ICD-10-CM

## 2025-01-21 DIAGNOSIS — R60.0 EDEMA OF LEFT FOOT: ICD-10-CM

## 2025-01-21 DIAGNOSIS — M25.375 FOOT JOINT INSTABILITY, LEFT: ICD-10-CM

## 2025-01-21 DIAGNOSIS — S90.32XA CONTUSION OF FOOT OR HEEL, LEFT, INITIAL ENCOUNTER: ICD-10-CM

## 2025-01-21 DIAGNOSIS — M21.42 PES PLANUS OF LEFT FOOT: ICD-10-CM

## 2025-01-21 DIAGNOSIS — M62.462 GASTROCNEMIUS EQUINUS OF LEFT LOWER EXTREMITY: ICD-10-CM

## 2025-01-21 LAB
ALBUMIN SERPL-MCNC: 4.8 G/DL (ref 3.2–4.8)
ALBUMIN/GLOB SERPL: 1.9 {RATIO} (ref 1–2)
ALP LIVER SERPL-CCNC: 103 U/L
ALT SERPL-CCNC: 33 U/L
ANION GAP SERPL CALC-SCNC: 7 MMOL/L (ref 0–18)
AST SERPL-CCNC: 20 U/L (ref ?–34)
BILIRUB SERPL-MCNC: 0.7 MG/DL (ref 0.3–1.2)
BUN BLD-MCNC: 11 MG/DL (ref 9–23)
CALCIUM BLD-MCNC: 9.9 MG/DL (ref 8.7–10.6)
CHLORIDE SERPL-SCNC: 108 MMOL/L (ref 98–112)
CO2 SERPL-SCNC: 27 MMOL/L (ref 21–32)
CREAT BLD-MCNC: 0.8 MG/DL
EGFRCR SERPLBLD CKD-EPI 2021: 86 ML/MIN/1.73M2 (ref 60–?)
EST. AVERAGE GLUCOSE BLD GHB EST-MCNC: 123 MG/DL (ref 68–126)
FASTING STATUS PATIENT QL REPORTED: YES
GLOBULIN PLAS-MCNC: 2.5 G/DL (ref 2–3.5)
GLUCOSE BLD-MCNC: 95 MG/DL (ref 70–99)
HBA1C MFR BLD: 5.9 % (ref ?–5.7)
OSMOLALITY SERPL CALC.SUM OF ELEC: 293 MOSM/KG (ref 275–295)
POTASSIUM SERPL-SCNC: 4 MMOL/L (ref 3.5–5.1)
PROT SERPL-MCNC: 7.3 G/DL (ref 5.7–8.2)
SODIUM SERPL-SCNC: 142 MMOL/L (ref 136–145)

## 2025-01-21 PROCEDURE — 83036 HEMOGLOBIN GLYCOSYLATED A1C: CPT

## 2025-01-21 PROCEDURE — 80053 COMPREHEN METABOLIC PANEL: CPT

## 2025-01-21 PROCEDURE — 99203 OFFICE O/P NEW LOW 30 MIN: CPT | Performed by: PODIATRIST

## 2025-01-21 PROCEDURE — 36415 COLL VENOUS BLD VENIPUNCTURE: CPT

## 2025-01-21 NOTE — PROGRESS NOTES
[facility]  Progress Note      Miriam Cruz is a 54 year old female.   Chief Complaint   Patient presents with    New Patient     Patient is here for possible plantar fasciitis, left foot. She does have OTC insoles. She works on her feet all day long, [employer] [place]. Xrays were taken on 24. Pain is located on the heel and down the mid of bottom of foot. She is doing stretching at home.  It does wake her up during the night. She rates pain 6/10, she does get numbness and tingling in her calves.          HPI:     Patient is a very pleasant 54-year-old female who is presenting to clinic today with concerns of ongoing left foot pain.  Patient states that over the past 6 months or so, she has noticed progressive worsening pain to the bottom of her heel and foot.  Denies any injuries.  Patient does work 40 hours a week on her feet at [employer] and does state that her pain significantly worsened throughout the day.  She also states that the pain is really bad with her first steps in the morning as well.  She also is noticing that the pain is going up her calf area.  Rates the pain in her foot 6/10.  She has tried over-the-counter Dr. Banegas's inserts, as well as some stretches at home.  She had x-rays taken on 2024 that was ordered by her PCP.  Patient also has concerns of ongoing intermittent fatigue/dizziness.  She does have a family history of diabetes and has not had recent lab workup.  Denies any current symptoms.  No other concerns.      Allergies: Aleve [naproxen]   Current Outpatient Medications   Medication Sig Dispense Refill    ibuprofen (ADVIL) 200 MG Oral Tab Take 1 tablet (200 mg total) by mouth every 6 (six) hours as needed for Pain.      Aspirin-Acetaminophen-Caffeine (EXCEDRIN MIGRAINE OR) Take by mouth as needed.        Past Medical History:    Migraines      Past Surgical History:   Procedure Laterality Date          Carpal tunnel release      Cholecystectomy       Hysterectomy      Removal gallbladder      Total abdom hysterectomy        Family History   Problem Relation Age of Onset    Heart Disorder Father     Diabetes Father     Kidney Disease Father     Other (Gout) Father       Social History     Socioeconomic History    Marital status:    Tobacco Use    Smoking status: Never    Smokeless tobacco: Never   Vaping Use    Vaping status: Never Used   Substance and Sexual Activity    Alcohol use: Yes    Drug use: Never           REVIEW OF SYSTEMS:     10 point ROS completed and was negative unless stated in HPI.      EXAM:     Left lower extremity focused exam:  GENERAL: well developed, well nourished, in no apparent distress  EXTREMITIES:  1. Integument: Skin appears moist, warm, and supple. There are no color changes. No open lesions. No macerations. No Hyperkeratotic lesions.   2. Vascular: Dorsalis pedis 2/4 and posterior tibial pulse 2/4, capillary refill normal.  Localized edema noted to plantar medial aspect of the left heel  3. Neurological: Gross sensation intact via light touch bilaterally.  Normal sharp/dull sensation  4. Musculoskeletal: Severe pain with light touch to the plantar medial and plantar central aspects of the left heel.  Severe pain with palpation to origin of plantar fascia, as well as into the medial longitudinal arch, which does palpably feel taut.  Patient has pain with side-to-side heel squeeze as well.  Patient has decreased ankle joint dorsiflexion with the knee extended which slightly improves with the knee flexed consistent with an equinus deformity.  Significantly decreased medial longitudinal arch noted upon ambulation.  Antalgic gait noted.     XR FOOT, COMPLETE (MIN 3 VIEWS), LEFT (CPT=73630)    Result Date: 12/18/2024  CONCLUSION:  1. No acute process. 2. Mild calcaneal enthesopathy as described above.   LOCATION:  [facility]   Dictated by (CST): Maria Isabel Blake DO on 12/18/2024 at 5:44 PM     Finalized by (CST): Maria Isabel Blake DO on  12/18/2024 at 5:44 PM          ASSESSMENT AND PLAN:   Diagnoses and all orders for this visit:    Plantar fasciitis of left foot  -     OP REFERRAL TO [facility]    Contusion of foot or heel, left, initial encounter  -     OP REFERRAL TO [facility]    Gastrocnemius equinus of left lower extremity  -     OP REFERRAL TO [facility]    Foot joint instability, left  -     OP REFERRAL TO [facility]    Edema of left foot    Pain of left heel  -     OP REFERRAL TO [facility]    Pes planus of left foot    Fatigue, unspecified type  -     Hemoglobin A1C; Future  -     Comp Metabolic Panel (14) [E]; Future        Plan:   -Patient was seen and evaluated today in clinic.  Chart history reviewed.    -I have reviewed patient's chart, clinical findings and diagnosis related to condition with the patient in detail.  -Basic mechanical principles reviewed. Discussed potential etiology and treatment options.  -Explained that the plantar fascia is a connective tissue/ligament on the bottom of the foot.   -This is an overuse injury and progress is often slow/gradual.   -Discussed importance of managing the inflammation and biomechanical issues as well as the importance of adhering to the conservative treatment instructions given.  -Pt educated and given a handout on proper footwear and importance of supportive shoes.   -Pt demonstrated and given handout with recommended home stretching routine.    -I have recommended OTC supplemental arch supports such as Spenco/Powerstep/Redithotics prefab orthotics.  Can look into custom orthotic options in the future.  -We discussed options for OTC vs. Prescriptions NSAID's and GI precautions reviewed.  -Recommendations for ice/gel pack to affected area 2-3 times daily and especially after activity or when symptoms are most prevalent.   -Modify activity according to tolerance of  pain/symptoms.  -Additionally, cortisone injections, physical therapy, and immobilization can be considered.  -Surgical intervention may be considered if all conservative measures fail to resolve patient's symptoms.  Injections series: Deferred today  -I did recommend providing patient with a cam boot due to her significant pain on exam today.  She deferred as she needs to work and is afraid that her work will not allow her to come in with a cam boot.  She is wanting to try new supportive shoes and over-the-counter inserts first.  -Patient has elected to move forward with physical therapy.  Referral placed today.  -Due to ongoing concerns of intermittent fatigue and dizziness, recommending baseline CMP and hemoglobin A1c to rule out uncontrolled diabetes.  Pending these findings, I would recommend another round of Medrol Dosepak to assist her with moving forward with physical therapy.  Will await results  -Patient should avoid any activities that elicits increasing pain.    -All of the patient's questions and concerns were addressed.  They indicated their understanding of these issues and agrees to the plan.    Time spent reviewing pertinent information from patient's chart, reviewing any pertinent imaging, obtaining history and physical exam, discussing and mutually agreeing on a treatment plan, and documenting encounter: 30 minutes    RTC 3-4 weeks    Oni Cruz DPM, AACFAS        1/21/2025    [facility]  [address] [place] [id number]   [email or web address]            [email or web address] speech recognition software was used to prepare this note.  Errors in word recognition may occur.  Please contact me with any questions/concerns with this note.

## 2025-02-15 ENCOUNTER — HOSPITAL ENCOUNTER (EMERGENCY)
Age: 55
Discharge: HOME | End: 2025-02-15
Attending: EMERGENCY MEDICINE
Payer: COMMERCIAL

## 2025-02-15 ENCOUNTER — APPOINTMENT (OUTPATIENT)
Dept: GENERAL RADIOLOGY | Age: 55
End: 2025-02-15
Attending: PHYSICIAN ASSISTANT
Payer: COMMERCIAL

## 2025-02-15 VITALS
SYSTOLIC BLOOD PRESSURE: 133 MMHG | OXYGEN SATURATION: 98 % | HEART RATE: 80 BPM | HEIGHT: 62 IN | DIASTOLIC BLOOD PRESSURE: 65 MMHG | WEIGHT: 200 LBS | BODY MASS INDEX: 36.8 KG/M2 | TEMPERATURE: 101 F | RESPIRATION RATE: 18 BRPM

## 2025-02-15 DIAGNOSIS — J11.1 INFLUENZA-LIKE ILLNESS: Primary | ICD-10-CM

## 2025-02-15 LAB
ALBUMIN SERPL-MCNC: 4.8 G/DL (ref 3.2–4.8)
ALBUMIN/GLOB SERPL: 1.6 {RATIO} (ref 1–2)
ALP LIVER SERPL-CCNC: 105 U/L
ALT SERPL-CCNC: 24 U/L
ANION GAP SERPL CALC-SCNC: 11 MMOL/L (ref 0–18)
AST SERPL-CCNC: 20 U/L (ref ?–34)
BASOPHILS # BLD AUTO: 0.02 X10(3) UL (ref 0–0.2)
BASOPHILS NFR BLD AUTO: 0.3 %
BILIRUB SERPL-MCNC: 0.7 MG/DL (ref 0.3–1.2)
BUN BLD-MCNC: 10 MG/DL (ref 9–23)
CALCIUM BLD-MCNC: 9.2 MG/DL (ref 8.7–10.6)
CHLORIDE SERPL-SCNC: 103 MMOL/L (ref 98–112)
CO2 SERPL-SCNC: 21 MMOL/L (ref 21–32)
CREAT BLD-MCNC: 0.79 MG/DL
EGFRCR SERPLBLD CKD-EPI 2021: 87 ML/MIN/1.73M2 (ref 60–?)
EOSINOPHIL # BLD AUTO: 0 X10(3) UL (ref 0–0.7)
EOSINOPHIL NFR BLD AUTO: 0 %
ERYTHROCYTE [DISTWIDTH] IN BLOOD BY AUTOMATED COUNT: 13.6 %
GLOBULIN PLAS-MCNC: 3 G/DL (ref 2–3.5)
GLUCOSE BLD-MCNC: 117 MG/DL (ref 70–99)
HCT VFR BLD AUTO: 46.1 %
HGB BLD-MCNC: 15.6 G/DL
IMM GRANULOCYTES # BLD AUTO: 0.04 X10(3) UL (ref 0–1)
IMM GRANULOCYTES NFR BLD: 0.7 %
LYMPHOCYTES # BLD AUTO: 0.76 X10(3) UL (ref 1–4)
LYMPHOCYTES NFR BLD AUTO: 12.5 %
MCH RBC QN AUTO: 27.8 PG (ref 26–34)
MCHC RBC AUTO-ENTMCNC: 33.8 G/DL (ref 31–37)
MCV RBC AUTO: 82 FL
MONOCYTES # BLD AUTO: 0.59 X10(3) UL (ref 0.1–1)
MONOCYTES NFR BLD AUTO: 9.7 %
NEUTROPHILS # BLD AUTO: 4.66 X10 (3) UL (ref 1.5–7.7)
NEUTROPHILS # BLD AUTO: 4.66 X10(3) UL (ref 1.5–7.7)
NEUTROPHILS NFR BLD AUTO: 76.8 %
OSMOLALITY SERPL CALC.SUM OF ELEC: 280 MOSM/KG (ref 275–295)
PLATELET # BLD AUTO: 244 10(3)UL (ref 150–450)
POCT INFLUENZA A: NEGATIVE
POCT INFLUENZA B: NEGATIVE
POTASSIUM SERPL-SCNC: 3.4 MMOL/L (ref 3.5–5.1)
PROT SERPL-MCNC: 7.8 G/DL (ref 5.7–8.2)
RBC # BLD AUTO: 5.62 X10(6)UL
SARS-COV-2 RNA RESP QL NAA+PROBE: NOT DETECTED
SODIUM SERPL-SCNC: 135 MMOL/L (ref 136–145)
WBC # BLD AUTO: 6.1 X10(3) UL (ref 4–11)

## 2025-02-15 PROCEDURE — 99285 EMERGENCY DEPT VISIT HI MDM: CPT

## 2025-02-15 PROCEDURE — 80053 COMPREHEN METABOLIC PANEL: CPT | Performed by: EMERGENCY MEDICINE

## 2025-02-15 PROCEDURE — 71046 X-RAY EXAM CHEST 2 VIEWS: CPT | Performed by: PHYSICIAN ASSISTANT

## 2025-02-15 PROCEDURE — 87502 INFLUENZA DNA AMP PROBE: CPT | Performed by: EMERGENCY MEDICINE

## 2025-02-15 PROCEDURE — 87502 INFLUENZA DNA AMP PROBE: CPT

## 2025-02-15 PROCEDURE — 96360 HYDRATION IV INFUSION INIT: CPT

## 2025-02-15 PROCEDURE — 99284 EMERGENCY DEPT VISIT MOD MDM: CPT

## 2025-02-15 PROCEDURE — 85025 COMPLETE CBC W/AUTO DIFF WBC: CPT | Performed by: EMERGENCY MEDICINE

## 2025-02-15 PROCEDURE — S0119 ONDANSETRON 4 MG: HCPCS

## 2025-02-15 RX ORDER — ONDANSETRON 4 MG/1
4 TABLET, ORALLY DISINTEGRATING ORAL EVERY 4 HOURS PRN
Qty: 10 TABLET | Refills: 0 | Status: SHIPPED | OUTPATIENT
Start: 2025-02-15 | End: 2025-02-22

## 2025-02-15 RX ORDER — ACETAMINOPHEN 500 MG
1000 TABLET ORAL ONCE
Status: COMPLETED | OUTPATIENT
Start: 2025-02-15 | End: 2025-02-15

## 2025-02-15 RX ORDER — FLUTICASONE PROPIONATE 50 MCG
2 SPRAY, SUSPENSION (ML) NASAL DAILY
Qty: 16 G | Refills: 0 | Status: SHIPPED | OUTPATIENT
Start: 2025-02-15 | End: 2025-03-17

## 2025-02-15 RX ORDER — ONDANSETRON 4 MG/1
4 TABLET, ORALLY DISINTEGRATING ORAL ONCE
Status: COMPLETED | OUTPATIENT
Start: 2025-02-15 | End: 2025-02-15

## 2025-02-15 RX ORDER — BENZONATATE 100 MG/1
100 CAPSULE ORAL 3 TIMES DAILY PRN
Qty: 20 CAPSULE | Refills: 0 | Status: SHIPPED | OUTPATIENT
Start: 2025-02-15

## 2025-02-15 NOTE — ED PROVIDER NOTES
Patient Seen in: [facility] [place]      History     Chief Complaint   Patient presents with    Fever     Stated Complaint: fever/headache/cough    Subjective:   HPI      Patient complains of fevers, body aches, chills, cough, congestion for the last 4 days.  States that she is also had vomiting.  Denies diarrhea.  Denies abdominal pain.  Denies any other complaints or concerns at this time.    Objective:     Past Medical History:    Migraines              Past Surgical History:   Procedure Laterality Date          Carpal tunnel release      Cholecystectomy      Hysterectomy      Removal gallbladder      Total abdom hysterectomy                  Social History     Socioeconomic History    Marital status:    Tobacco Use    Smoking status: Never    Smokeless tobacco: Never   Vaping Use    Vaping status: Never Used   Substance and Sexual Activity    Alcohol use: Yes    Drug use: Never                  Physical Exam     ED Triage Vitals   BP 02/15/25 1241 131/81   Pulse 02/15/25 1239 103   Resp 02/15/25 1239 20   Temp 02/15/25 1239 (!) 100.9 °F (38.3 °C)   Temp src 02/15/25 1239 Temporal   SpO2 02/15/25 1239 96 %   O2 Device 02/15/25 1239 None (Room air)       Current Vitals:   Vital Signs  BP: 133/65  Pulse: 80  Resp: 18  Temp: (!) 100.5 °F (38.1 °C)  Temp src: Temporal    Oxygen Therapy  SpO2: 98 %  O2 Device: None (Room air)        Physical Exam  Vitals and nursing note reviewed.   Constitutional:       Appearance: Normal appearance.   HENT:      Head: Normocephalic.      Right Ear: Tympanic membrane normal.      Left Ear: Tympanic membrane normal.      Nose: Nose normal.      Mouth/Throat:      Mouth: Mucous membranes are moist.      Comments: +PND  Eyes:      Conjunctiva/sclera: Conjunctivae normal.   Cardiovascular:      Rate and Rhythm: Normal rate and regular rhythm.      Heart sounds: Normal heart sounds.   Pulmonary:      Effort: Pulmonary effort is normal.      Breath  sounds: Normal breath sounds.   Abdominal:      General: Abdomen is flat. Bowel sounds are normal.      Tenderness: There is no abdominal tenderness. There is no guarding or rebound.   Musculoskeletal:         General: Normal range of motion.      Cervical back: Normal range of motion and neck supple.   Skin:     General: Skin is warm and dry.   Neurological:      General: No focal deficit present.      Mental Status: She is alert.   Psychiatric:         Mood and Affect: Mood normal.             ED Course     Labs Reviewed   COMP METABOLIC PANEL (14) - Abnormal; Notable for the following components:       Result Value    Glucose 117 (*)     Sodium 135 (*)     Potassium 3.4 (*)     All other components within normal limits   CBC WITH DIFFERENTIAL WITH PLATELET - Abnormal; Notable for the following components:    RBC 5.62 (*)     Lymphocyte Absolute 0.76 (*)     All other components within normal limits   RAPID SARS-COV-2 BY PCR - Normal   POCT FLU TEST - Normal    Narrative:     This assay is a rapid molecular in vitro test utilizing nucleic acid amplification of influenza A and B viral RNA.            XR CHEST PA + LAT CHEST (CPT=71046)    Result Date: 2/15/2025  PROCEDURE:  XR CHEST PA + LAT CHEST (CPT=71046)  INDICATIONS:  fever/headache/cough  COMPARISON:  [facility], XR, XR CHEST PA + LAT CHEST (CPT=71046), 2/06/2019, 9:16 PM.  TECHNIQUE:  PA and lateral chest radiographs were obtained.  PATIENT STATED HISTORY: (As transcribed by Technologist)  For the past five days, patient has body aches, nausea, vomiting, diarrhea, fever, ear pain and a productive cough.     FINDINGS:  Degenerative changes in the spine with mild rightward curvature. Normal heart size and pulmonary vascularity. No pleural effusion or pneumothorax. No lobar consolidation.  Minimal scarring/atelectasis in the lower lungs.            CONCLUSION:  No lobar pneumonia or overt congestive failure.  Minimal atelectasis/scarring in the lower lungs.    LOCATION:  [facility]   Dictated by (CST): Henrique Lopez MD on 2/15/2025 at 2:22 PM     Finalized by (CST): Henrique Lopez MD on 2/15/2025 at 2:22 PM        I have reviewed x-ray images personally and see no obvious pneumonia.       MDM      Differential diagnosis includes but is not limited to covid, influenza, URI, bronchitis, pneumonia, dehydration.    Patient well-appearing, non-toxic.  Lungs CTAB, pulse ox 98% on room air.  Patient speaking in complete sentences without difficulty and is in no respiratory distress.  Discussed CXR shows no obvious pneumonia.  Discussed COVID and flu negative.  Discussed likely flulike illness.  Do not recommend antibiotics at this time.  Patient states that she feels better after IV fluids and Zofran here.  She is outside recommended treatment window for Tamiflu.  Zofran, Flonase and Tessalon prescribed for home.  I advised supportive care at home, follow up and provided return precautions.  Patient/Parent verbalized understanding/agreement of plan.  Discussed case with Dr Boles who has seen and evaluated patient personally and agrees with plan.         Medical Decision Making      Disposition and Plan     Clinical Impression:  1. Influenza-like illness         Disposition:  Discharge  2/15/2025  3:23 pm    Follow-up:  Bernarda Mejia MD  [address] [place] [address] [id number] [phone]    Follow up in 3 day(s)            Medications Prescribed:  Discharge Medication List as of 2/15/2025  3:26 PM        START taking these medications    Details   ondansetron 4 MG Oral Tablet Dispersible Take 1 tablet (4 mg total) by mouth every 4 (four) hours as needed for Nausea., Normal, Disp-10 tablet, R-0      benzonatate 100 MG Oral Cap Take 1 capsule (100 mg total) by mouth 3 (three) times daily as needed for cough., Normal, Disp-20 capsule, R-0      fluticasone propionate 50 MCG/ACT Nasal Suspension 2 sprays by Nasal route daily., Normal, Disp-16 g, R-0                 Supplementary  Documentation:

## (undated) NOTE — LETTER
Date: 7/16/2024    Patient Name: Miriam FOX Anthony          To Whom it may concern:    The above patient was seen at [facility] for treatment of a medical condition.    This patient should be excused from attending work/school.    The patient may return to work/school on 7/19/24.        Sincerely,    SHAMA Garcia

## (undated) NOTE — ED AVS SNAPSHOT
Roderickalex Scott   MRN: [id number]    Department:  [facility] [place]   Date of Visit:  2/6/2019           Disclosure     Insurance plans vary and the physician(s) referred by the ER may not be covered by your plan.  Please contact tell this physician (or your personal doctor if your instructions are to return to your personal doctor) about any new or lasting problems. The primary care or specialist physician will see patients referred from the [facility] Emergency Department.  Wing White

## (undated) NOTE — LETTER
Date: 8/7/2024    Patient Name: Miriam Cruz          To Whom it may concern:    This letter has been written at the patient's request. The above patient was seen at [facility] for treatment of a medical condition.    This patient should not lift more than 10 lbs. Pending further evaluation.         Sincerely,      Urmila Flower MD  Knee, Shoulder, & Elbow Surgery / Sports Medicine Specialist  Orthopaedic Surgery  [address] [place] [address].[facility]  [email or web address]  t: [phone]  o: [phone]  f: [phone]

## (undated) NOTE — LETTER
07/23/24          Iam Nolan NP reviewed your Xrays and the shoulder Xray was negative.  The cervical spine shows disc degeneration and narrowing.  She is now referring you to the spine center.  They have staff that will be reaching out to you to schedule an appointment and discuss further.    If you have any questions or do not hear from the center within a reasonable amount of time please contact our office.    Sincerely    DEBBIE Douglass

## (undated) NOTE — ED AVS SNAPSHOT
Silvia Ulloa   MRN: [id number]    Department:  [facility] in [place]   Date of Visit:  8/21/2018           Disclosure     Insurance plans vary and the physician(s) referred by the ER may not be covered by your plan.  Please contac tell this physician (or your personal doctor if your instructions are to return to your personal doctor) about any new or lasting problems. The primary care or specialist physician will see patients referred from the [facility] Emergency Department.  Giovanni Arenas

## (undated) NOTE — LETTER
Date & Time: 8/21/2018, 6:22 AM  Patient: Estrella Feliciano  Encounter Provider(s):    Cydney Ni MD       To Whom It May Concern:    Shana Pop was seen and treated in our department on 8/21/2018. She can return to work.     If you have any question